# Patient Record
Sex: FEMALE | Race: BLACK OR AFRICAN AMERICAN
[De-identification: names, ages, dates, MRNs, and addresses within clinical notes are randomized per-mention and may not be internally consistent; named-entity substitution may affect disease eponyms.]

---

## 2018-03-21 ENCOUNTER — HOSPITAL ENCOUNTER (EMERGENCY)
Dept: HOSPITAL 62 - ER | Age: 33
Discharge: HOME | End: 2018-03-21
Payer: SELF-PAY

## 2018-03-21 VITALS — DIASTOLIC BLOOD PRESSURE: 94 MMHG | SYSTOLIC BLOOD PRESSURE: 133 MMHG

## 2018-03-21 DIAGNOSIS — L02.214: Primary | ICD-10-CM

## 2018-03-21 DIAGNOSIS — F17.210: ICD-10-CM

## 2018-03-21 DIAGNOSIS — Z88.0: ICD-10-CM

## 2018-03-21 DIAGNOSIS — I10: ICD-10-CM

## 2018-03-21 DIAGNOSIS — Z71.6: ICD-10-CM

## 2018-03-21 PROCEDURE — S0119 ONDANSETRON 4 MG: HCPCS

## 2018-03-21 PROCEDURE — 87205 SMEAR GRAM STAIN: CPT

## 2018-03-21 PROCEDURE — 10060 I&D ABSCESS SIMPLE/SINGLE: CPT

## 2018-03-21 PROCEDURE — 87077 CULTURE AEROBIC IDENTIFY: CPT

## 2018-03-21 PROCEDURE — 99283 EMERGENCY DEPT VISIT LOW MDM: CPT

## 2018-03-21 PROCEDURE — 87186 SC STD MICRODIL/AGAR DIL: CPT

## 2018-03-21 PROCEDURE — A6266 IMPREG GAUZE NO H20/SAL/YARD: HCPCS

## 2018-03-21 PROCEDURE — 87070 CULTURE OTHR SPECIMN AEROBIC: CPT

## 2018-03-21 PROCEDURE — 99406 BEHAV CHNG SMOKING 3-10 MIN: CPT

## 2018-03-21 NOTE — ER DOCUMENT REPORT
ED Skin Rash/Insect Bite/Abscs





- General


Chief Complaint: Abscess


Stated Complaint: SKIN PROBLEM


Time Seen by Provider: 03/21/18 09:25


Mode of Arrival: Ambulatory


Information source: Patient


Notes: 





32-year-old female presents to ED for abscess to the left pubic area.  She 

states that she shaved she thought she did a good job cleaning and clean and 

afterwards so she would not get another one of these abscesses but she does 

have one.  States that there for several days.  She has a history of high blood 

pressure but she has not been on any medication.  Last menstrual period last 

week


TRAVEL OUTSIDE OF THE [place] IN LAST 30 DAYS: No





- HPI


Patient complains to provider of: Tender/swollen area


Onset: Last week


Onset/Duration: Gradual


Quality of pain: Sharp, Throbbing


Severity: Moderate


Pain Level: 4


Skin Character: Abscess


Quality of rash: Painful


Identify cause: No


Exacerbated by: Movement, Walking


Relieved by: Denies


Similar symptoms previously: Yes


Recently seen / treated by doctor: No





- Related Data


Allergies/Adverse Reactions: 


 





Penicillins Allergy (Verified 11/05/15 17:49)


 











Past Medical History





- General


Information source: Patient





- Social History


Smoking Status: Current Every Day Smoker


Cigarette use (# per day): Yes - 2 to 3 black and mild a day


Chew tobacco use (# tins/day): No


Smoking Education Provided: Yes - 4 minutes


Frequency of alcohol use: Occasional


Drug Abuse: None


Lives with: Family


Family History: Arthritis, CVA - Mom at 28., DM, Hypertension, Thyroid 

Disfunction.  denies: CAD, COPD, Hyperlipidemia, Malignancy


Patient has suicidal ideation: No


Patient has homicidal ideation: No





- Past Medical History


Cardiac Medical History: Reports: Hx Hypertension - OFF MEDS SINCE MARCH -- 

STATES WAS SEEN IN OB OFFICE FOR PLACEMENT  OF IUD IN FEB SENT TO THE ER WITH 

HTN - PLACED ON MEDS - RAN OUT --DID NOT RETURN TO ANY PROVIDER FOR REFILL.


Pulmonary Medical History: Reports: None


EENT Medical History: Reports: None


Neurological Medical History: Reports: None


Endocrine Medical History: Reports: None


Renal/ Medical History: Reports: None


Malignancy Medical History: Reports: None


GI Medical History: Reports: None


Musculoskeltal Medical History: Reports None


Skin Medical History: Reports Hx Cellulitis


Psychiatric Medical History: Reports: None


Traumatic Medical History: Reports: None


Infectious Medical History: Reports: None


Surgical Hx: Negative


Past Surgical History: Reports: None





- Immunizations


Immunizations up to date: Yes


Hx Diphtheria, Pertussis, Tetanus Vaccination: Yes - 08/01/2011


Hx Pneumococcal Vaccination: 10/01/01





Review of Systems





- Review of Systems


Constitutional: No symptoms reported


EENT: No symptoms reported


Cardiovascular: No symptoms reported


Respiratory: No symptoms reported


Gastrointestinal: No symptoms reported


Genitourinary: No symptoms reported


Female Genitourinary: No symptoms reported


Musculoskeletal: No symptoms reported


Skin: Other - Abscess left pubic area


Hematologic/Lymphatic: No symptoms reported


Neurological/Psychological: No symptoms reported


-: Yes All other systems reviewed and negative





Physical Exam





- Vital signs


Vitals: 


 











Temp Pulse Resp BP Pulse Ox


 


 98.6 F   93   14   142/93 H  97 


 


 03/21/18 09:09  03/21/18 09:09  03/21/18 09:09  03/21/18 09:09  03/21/18 09:09











Interpretation: Normal





- General


General appearance: Appears well, Alert





- HEENT


Head: Normocephalic, Atraumatic


Eyes: Normal


Pupils: PERRL





- Respiratory


Respiratory status: No respiratory distress


Chest status: Nontender


Breath sounds: Normal


Chest palpation: Normal





- Cardiovascular


Rhythm: Regular


Heart sounds: Normal auscultation


Murmur: No





- Abdominal


Inspection: Normal


Distension: No distension


Bowel sounds: Normal


Tenderness: Nontender


Organomegaly: No organomegaly





- Genitourinary


External exam: Other - Abscess left pubic area





- Back


Back: Normal, Nontender





- Extremities


General upper extremity: Normal inspection, Nontender, Normal color, Normal ROM

, Normal temperature


General lower extremity: Normal inspection, Nontender, Normal color, Normal ROM

, Normal temperature, Normal weight bearing.  No: Long's sign





- Neurological


Neuro grossly intact: Yes


Cognition: Normal


Orientation: AAOx4


[place] Coma Scale Eye Opening: Spontaneous


[place] Coma Scale Verbal: Oriented


[place] Coma Scale Motor: Obeys Commands


[place] Coma Scale Total: 15


Speech: Normal


Motor strength normal: LUE, RUE, LLE, RLE


Sensory: Normal





- Psychological


Associated symptoms: Normal affect, Normal mood





- Skin


Skin Temperature: Warm


Skin Moisture: Dry


Skin Color: Normal


Skin irregularity: Abscess - Left pubic area


Irregularity with: Swelling, Tenderness, Warmth





Course





- Vital Signs


Vital signs: 


 











Temp Pulse Resp BP Pulse Ox


 


 98.3 F   90   16   133/94 H  99 


 


 03/21/18 10:47  03/21/18 10:47  03/21/18 10:47  03/21/18 10:47  03/21/18 10:47














Procedures





- Incision and Drainage


  ** Left Groin


Time completed: 10:33


Type: Simple


Anesthetic type: 1% Lidocaine


mL's of anesthetic: 10


Blade size: 11


I&D procedure: Shurclens applied, Iodoform packing placed


Incision Method: Incision made by scalpel


Amount/type of drainage: Large amount of purulent drainage





Discharge





- Discharge


Clinical Impression: 


 Abscess of left groin





Condition: Stable


Disposition: HOME, SELF-CARE


Additional Instructions: 


ABSCESS:





     You have an abscess (boil).  This a pus-forming infection, usually due to 

staph.  Some boils may be left to drain on their own, but most require lancing.


     From the time the tender lump first appears, it may be three or four days 

before the abscess is ready to oliva.  Local heat and rest help at this stage 

of treatment.  An antibiotic may prevent spread of the infection.


     Once the abscess is opened, packing may be placed into it.  This is done 

so pus is not sealed inside by premature closure of the cavity. The packing 

will be removed at your follow-up visit or you may be advised to remove it 

yourself at home.  Sometimes this packing must be replaced a few times during 

healing.


     The wound will heal with surprisingly little scar.  Depending on the size 

and location of an abscess, healing can take one to four weeks.


     You may shower and wash the area around the incision site two or three 

times a day.


     Antibiotics may be prescribed, but are usually not necessary after an 

abscess has been drained.


     If you develop fever, chills, worsening pain, or increasing swelling in 

the area, call the doctor or return immediately.








POST INCISION AND DRAINAGE:


     You have had an incision made to allow drainage of an abscess. The 

incision must remain open so that pus and debris can drain from the wound.


     If the abscess cavity is large, packing is placed.  This keeps the tissues 

from collapsing and trapping pus inside, while the body shrinks the cavity.  

The packing may need to be replaced every day or two.  The physician will 

instruct you on the packing.


     Keep a bulky dressing over the area.  Replace it if it becomes saturated 

with blood or pus.  Do not disturb the packing (if present).


     You may shower and cleanse the area with gentle soap and warm water two or 

three times a day.  Local warmth may be soothing, and may promote faster 

healing.


     Return if you develop high fever or chills, or if you note spreading 

redness, increasing swelling, or increasing tenderness.








ORAL NARCOTIC MEDICATION:


     You have been given a prescription for pain control.  This medication is a 

narcotic.  It's best taken with food, as nausea can result if taken on an empty 

stomach.


     Don't operate machinery or drive within six hours of taking this 

medication.  Do not combine this medicine with alcohol, or with any medication 

which can cause sedation (such as cold tablets or sleeping pills) unless you 

get permission from the physician.


     Narcotics tend to cause constipation.  If possible, drink plenty of fluids 

and eat a diet high in fiber and fruits.





DOXYCYCLINE:


     Doxycycline (Vibramycin, Doryx) is an antibiotic of the tetracycline 

family.  This type of drug is useful for infections of the respiratory tract 

and genital tract, and is sometimes used for intestinal infections.


     Unlike most tetracyclines, doxycycline can be taken with food.  It is 

longer acting, and (usually) less prone to side effects than regular 

tetracycline.


     Tetracycline antibiotics can stain immature teeth and SHOULD NOT BE TAKEN 

BY CHILDREN, NURSING MOTHERS, OR PREGNANT WOMEN.


     Tetracyclines can make you more prone to sunburn.  Abdominal cramping, 

nausea, and diarrhea are occasional side effects.  Women may experience vaginal 

yeast infections.


     Call the doctor at once if you develop hives, itching, shortness of breath

, or lightheadedness.





Return to the emergency room in 3 days to have your abscess reassessed in the 

packing removed and possibly be placed.








FOLLOW-UP CARE:


Most simple abscesses will not require a follow up visit.   If you had packing 

placed in the abscess, remove it as instructed by the physician.  If you have 

been referred to a physician for follow-up care, call the physicians office 

for an appointment as you were instructed or within the next two days.  If you 

experience worsening or a significant change in your symptoms, return to the 

Emergency Department at any time for re-evaluation.





Prescriptions: 


Oxycodone HCl/Acetaminophen [Percocet 5-325 mg Tablet] 1 tab PO Q6HP PRN #7 

tablet


 PRN Reason: 


Doxycycline Hyclate 100 mg PO BID #20 capsule


Forms:  Elevated Blood Pressure, Smoking Cessation Education, Return to Work


Referrals: 


COLT EDWARDS MD [Primary Care Provider] - Follow up as needed

## 2019-08-15 ENCOUNTER — HOSPITAL ENCOUNTER (EMERGENCY)
Dept: HOSPITAL 62 - ER | Age: 34
Discharge: HOME | End: 2019-08-15
Payer: SELF-PAY

## 2019-08-15 VITALS — SYSTOLIC BLOOD PRESSURE: 156 MMHG | DIASTOLIC BLOOD PRESSURE: 103 MMHG

## 2019-08-15 DIAGNOSIS — F17.200: ICD-10-CM

## 2019-08-15 DIAGNOSIS — I10: ICD-10-CM

## 2019-08-15 DIAGNOSIS — N39.0: Primary | ICD-10-CM

## 2019-08-15 LAB
APPEARANCE UR: (no result)
APTT PPP: YELLOW S
BILIRUB UR QL STRIP: NEGATIVE
GLUCOSE UR STRIP-MCNC: NEGATIVE MG/DL
KETONES UR STRIP-MCNC: NEGATIVE MG/DL
NITRITE UR QL STRIP: NEGATIVE
PH UR STRIP: 6 [PH] (ref 5–9)
PROT UR STRIP-MCNC: NEGATIVE MG/DL
SP GR UR STRIP: 1.02
UROBILINOGEN UR-MCNC: NEGATIVE MG/DL (ref ?–2)

## 2019-08-15 PROCEDURE — 87086 URINE CULTURE/COLONY COUNT: CPT

## 2019-08-15 PROCEDURE — 81001 URINALYSIS AUTO W/SCOPE: CPT

## 2019-08-15 PROCEDURE — 99284 EMERGENCY DEPT VISIT MOD MDM: CPT

## 2019-08-15 NOTE — ER DOCUMENT REPORT
ED Medical Screen (RME)





- General


Chief Complaint: Flank Pain


Stated Complaint: URINARY SYMPTOMS


Time Seen by Provider: 08/15/19 15:54


Primary Care Provider: 


COLT EDWARDS MD [Primary Care Provider] - Follow up as needed


Mode of Arrival: Ambulatory


Information source: Patient


Notes: 





Patient is a otherwise healthy 33-year-old female [facility] dys[facility] and low back pain.  Patient denies any 

vomiting or fevers.





Exam:


Right CVA tenderness present.





I have greeted and performed a rapid initial assessment of this patient.  A 

comprehensive ED assessment and evaluation of the patient, analysis of test 

results and completion of the medical decision making process will be conducted 

by additional ED providers.  I have specifically instructed the patient or 

family members with the patient to immediately return to any nursing staff 

should anything change in the patient's condition or with their chief complaint.





This medical record was dictated with voice recognizing software.  There may be 

grammatical, syntax errors that are unintended.


TRAVEL OUTSIDE OF THE [place] IN LAST 30 DAYS: No





- Related Data


Allergies/Adverse Reactions: 


                                        





Penicillins Allergy (Verified 08/15/19 15:00)


   











Past Medical History





- Social History


Frequency of alcohol use: Occasional


Drug Abuse: None


Family history: Hypertension





- Past Medical History


Cardiac Medical History: Reports: Hx Hypertension - OFF MEDS SINCE MARCH -- 

STATES WAS SEEN IN OB OFFICE FOR PLACEMENT  OF IUD IN FEB SENT TO THE ER WITH 

HTN - PLACED ON MEDS - RAN OUT --DID NOT RETURN TO ANY PROVIDER FOR REFILL.


Renal/ Medical History: Denies: Hx Peritoneal Dialysis


Skin Medical History: Reports Hx Cellulitis





- Immunizations


Immunizations up to date: Yes


Hx Diphtheria, Pertussis, Tetanus Vaccination: Yes - 08/01/2011





Physical Exam





- Vital signs


Vitals: 





                                        











Temp Pulse Resp BP Pulse Ox


 


 98.1 F   83   18   165/109 H  96 


 


 08/15/19 15:01  08/15/19 15:01  08/15/19 15:01  08/15/19 15:01  08/15/19 15:01














Course





- Vital Signs


Vital signs: 





                                        











Temp Pulse Resp BP Pulse Ox


 


 98.1 F   83   18   165/109 H  96 


 


 08/15/19 15:01  08/15/19 15:01  08/15/19 15:01  08/15/19 15:01  08/15/19 15:01














Doctor's Discharge





- Discharge


Referrals: 


COLT EDWARDS MD [Primary Care Provider] - Follow up as needed

## 2019-08-15 NOTE — ER DOCUMENT REPORT
HPI





- HPI


Time Seen by Provider: 08/15/19 15:54


Pain Level: 4


Notes: 





Patient is a otherwise healthy 33-year-old female presented to the emergency 

department chief complaint of dysuria and low back pain.  Patient denies any 

vomiting or fevers.








- REPRODUCTIVE


Reproductive: DENIES: Pregnant:





Past Medical History





- General


Information source: Patient





- Social History


Smoking Status: Current Every Day Smoker


Frequency of alcohol use: Occasional


Drug Abuse: None


Family History: Arthritis, CVA - Mom at 28., DM, Hypertension, Thyroid 

Disfunction.  denies: CAD, COPD, Hyperlipidemia, Malignancy


Patient has suicidal ideation: No


Patient has homicidal ideation: No





- Past Medical History


Cardiac Medical History: Reports: Hx Hypertension - OFF MEDS SINCE MARCH -- 

STATES WAS SEEN IN OB OFFICE FOR PLACEMENT  OF IUD IN FEB SENT TO THE ER WITH 

HTN - PLACED ON MEDS - RAN OUT --DID NOT RETURN TO ANY PROVIDER FOR REFILL.


Renal/ Medical History: Denies: Hx Peritoneal Dialysis


Skin Medical History: Reports Hx Cellulitis





- Immunizations


Immunizations up to date: Yes


Hx Diphtheria, Pertussis, Tetanus Vaccination: Yes - 08/01/2011


Hx Pneumococcal Vaccination: 10/01/01





Vertical Provider Document





- CONSTITUTIONAL


Notes: 





PHYSICAL EXAMINATION:





GENERAL: Well-appearing, well-nourished and in no acute distress.





HEAD: Atraumatic, normocephalic.





EYES: Pupils equal round and reactive to light, extraocular movements intact, 

conjunctiva are normal.





ENT: Nares patent, oropharynx clear without exudates.  Moist mucous membranes.





NECK: Normal range of motion, supple without lymphadenopathy





LUNGS: Breath sounds clear to auscultation bilaterally and equal.  No wheezes 

rales or rhonchi.





HEART: Regular rate and rhythm without murmurs





ABDOMEN: Soft, nontender, nondistended abdomen.  No guarding, no rebound.  No 

masses appreciated.





Female : Right CVA tenderness.





Musculoskeletal: Normal range of motion, no pitting or edema.  No cyanosis.





NEUROLOGICAL: Cranial nerves grossly intact.  Normal speech, normal gait.  

Normal sensory, motor exams





PSYCH: Normal mood, normal affect.





SKIN: Warm, Dry, normal turgor, no rashes or lesions noted.





- INFECTION CONTROL


TRAVEL OUTSIDE OF THE [place] IN LAST 30 DAYS: No





Course





- Re-evaluation


Re-evalutation: 





Urinalysis shows moderate leukocyte esterase.  Patient appears well, nontoxic 

she is alert, oriented and answering all questions appropriately.  Her vital 

signs are within normal limits.  She has been afebrile here in the emergency 

department.  We will start her on oral antibiotics for urinary tract infection. 

Urine culture pending.  Patient understands ED return precautions.





The patient's emergency department workup and current diagnosis were explained 

to the patient and or family.  Follow-up instructions were provided.  

Medications if prescribed were discussed. Instructions for when to return to the

emergency department including specific worrisome symptoms were discussed with 

the patient and/or family.








- Vital Signs


Vital signs: 


                                        











Temp Pulse Resp BP Pulse Ox


 


 98.0 F   75   18   156/103 H  100 


 


 08/15/19 16:51  08/15/19 16:51  08/15/19 16:51  08/15/19 16:51  08/15/19 16:51














- Laboratory


Laboratory results interpreted by me: 


                                        











  08/15/19





  16:04


 


Urine Blood  MODERATE H


 


Ur Leukocyte Esterase  MODERATE H














Discharge





- Discharge


Clinical Impression: 


UTI (urinary tract infection)


Qualifiers:


 Urinary tract infection type: site unspecified Hematuria presence: without he

maturia Qualified Code(s): N39.0 - Urinary tract infection, site not specified





Condition: Stable


Disposition: HOME, SELF-CARE


Additional Instructions: 


Your urine shows findings consistent with a urinary tract infection.  Please 

take all the antibiotics as directed even if your symptoms have improved.  

Please follow-up with your primary care physician as needed.  Return to 

emergency room if you develop fever >101F, persistent vomiting, become 

lethargic, have severe pain in your sides, or any other symptoms that are 

concerning to you.


Prescriptions: 


Nitrofurantoin Macrocrystal [Macrodantin] 100 mg PO BID #14 capsule


Referrals: 


COLT EDWARDS MD [Primary Care Provider] - Follow up as needed

## 2020-08-21 ENCOUNTER — HOSPITAL ENCOUNTER (EMERGENCY)
Dept: HOSPITAL 62 - ER | Age: 35
Discharge: HOME | End: 2020-08-21
Payer: SELF-PAY

## 2020-08-21 VITALS — DIASTOLIC BLOOD PRESSURE: 111 MMHG | SYSTOLIC BLOOD PRESSURE: 163 MMHG

## 2020-08-21 DIAGNOSIS — R31.9: ICD-10-CM

## 2020-08-21 DIAGNOSIS — Z88.0: ICD-10-CM

## 2020-08-21 DIAGNOSIS — A54.9: ICD-10-CM

## 2020-08-21 DIAGNOSIS — R11.0: ICD-10-CM

## 2020-08-21 DIAGNOSIS — I10: ICD-10-CM

## 2020-08-21 DIAGNOSIS — F17.210: ICD-10-CM

## 2020-08-21 DIAGNOSIS — N39.0: Primary | ICD-10-CM

## 2020-08-21 PROCEDURE — S0119 ONDANSETRON 4 MG: HCPCS

## 2020-08-21 PROCEDURE — 81001 URINALYSIS AUTO W/SCOPE: CPT

## 2020-08-21 PROCEDURE — 74176 CT ABD & PELVIS W/O CONTRAST: CPT

## 2020-08-21 PROCEDURE — 81025 URINE PREGNANCY TEST: CPT

## 2020-08-21 PROCEDURE — 99284 EMERGENCY DEPT VISIT MOD MDM: CPT

## 2020-08-21 NOTE — ER DOCUMENT REPORT
ED General Pain





- General


Chief Complaint: Flank Pain


Stated Complaint: LOW BACK PAIN,PAIN WITH URINATION


Information source: Patient


Notes: 





Patient is a 34-year-old female presenting to the emergency department chief 

complaint of lower abdomen right flank and right groin pain.  Patient states it 

is been ongoing for a couple of days.  Of note the patient was just recently 

treated through the health department for gonorrhea.  Patient states her last 

menstrual period completed on 5 August.  Patient denies any slip or falls or any

obvious trauma.


TRAVEL OUTSIDE OF THE [place] IN LAST 30 DAYS: No





- HPI


Onset: Last week


Onset/Duration: Gradual, Persistent


Quality of pain: Achy, Pressure


Severity: Moderate


Pain Level: 2


Context: New onset


Typical of prior episodes of painful crisis: No


Associated symptoms: Other - Frequency urgency and burning with urination


Exacerbated by: Movement, Walking


Relieved by: Denies


Similar symptoms previously: No


Recently seen / treated by doctor: Yes





- Related Data


Allergies/Adverse Reactions: 


                                        





Penicillins Allergy (Verified 08/21/20 08:14)


   








Home Medications: atb





Past Medical History





- General


Information source: Patient





- Social History


Smoking Status: Current Every Day Smoker


Cigarette use (# per day): Yes


Chew tobacco use (# tins/day): No


Smoking Education Provided: Yes


Frequency of alcohol use: None


Drug Abuse: None


Lives with: Spouse/Significant other


Family History: Arthritis, CVA - Mom at 28., DM, Hypertension, Thyroid 

Disfunction.  denies: CAD, COPD, Hyperlipidemia, Malignancy


Patient has suicidal ideation: No


Patient has homicidal ideation: No





- Past Medical History


Cardiac Medical History: Reports: Hx Hypertension - OFF MEDS SINCE MARCH -- 

STATES WAS SEEN IN OB OFFICE FOR PLACEMENT  OF IUD IN FEB SENT TO THE ER WITH 

HTN - PLACED ON MEDS - RAN OUT --DID NOT RETURN TO ANY PROVIDER FOR REFILL.


Renal/ Medical History: Denies: Hx Peritoneal Dialysis


Skin Medical History: Reports Hx Cellulitis





- Immunizations


Immunizations up to date: Yes


Hx Diphtheria, Pertussis, Tetanus Vaccination: Yes - 08/01/2011


Hx Pneumococcal Vaccination: 10/01/01





Review of Systems





- Review of Systems


Notes: 





REVIEW OF SYSTEMS:


CONSTITUTIONAL :  Denies fever,  chills, or sweats.  Denies recent illness.





EENT:   Denies eye, ear, throat, or mouth pain or symptoms.  Denies nasal or 

sinus congestion.





CARDIOVASCULAR:  Denies chest pain.





RESPIRATORY:  Denies cough, cold, or chest congestion.  Denies shortness of 

breath, difficulty breathing, or wheezing.





GASTROINTESTINAL: Per HPI





GENITOURINARY: Per HPI





MUSCULOSKELETAL:  Denies neck or back pain or joint pain or swelling.





SKIN:   Denies rash or skin lesions.





HEMATOLOGIC :   Denies easy bruising or bleeding.





NEUROLOGICAL:  Denies altered mental status or loss of consciousness.  Denies 

headache.  Denies weakness or paralysis or loss of use of either side.  Denies 

problems with gait or speech.  Denies sensory or motor loss.





PSYCHIATRIC:  Denies suicidal or homicidal ideations





10 Systems are negative unless otherwise specified above





Physical Exam





- Vital signs


Vitals: 


                                        











Temp Pulse Resp BP Pulse Ox


 


 98.3 F   81   16   157/105 H  100 


 


 08/21/20 07:36  08/21/20 07:36  08/21/20 07:36  08/21/20 07:36  08/21/20 07:36














- Notes


Notes: 





PHYSICAL EXAMINATION:


 


GENERAL: Well-appearing, well-nourished and in no acute distress.


 


HEAD: Atraumatic, normocephalic.


 


EYES: Pupils equal round and reactive to light, extraocular movements intact, 

sclera anicteric, conjunctiva are normal.


 


ENT: nares patent, oropharynx clear without exudates.  Moist mucous membranes.


 


NECK: Normal range of motion, supple without lymphadenopathy, no appreciable JVD


 


LUNGS: Lungs clear to auscultation bilaterally and equal.  No wheezes rales or 

rhonchi.


 


HEART: Regular rate and rhythm without murmurs


 


ABDOMEN: Patient has tenderness in the low abdomen right groin and has positive 

CVA tenderness.  There is questionable rebound.


 


EXTREMITIES: Active full range of motion, no pitting or edema.  No cyanosis. 2+ 

pulses x4


 


NEUROLOGICAL: No focal neurological deficits. Moves all extremities 

spontaneously and on command.


 


SKIN: Warm, Dry, and intact. Normal turgor, no rashes or lesions noted.





Course





- Re-evaluation


Re-evalutation: 





08/21/20 13:17


Because of the patient's symptomatology and blood in the urine we did perform a 

CT abdomen pelvis stone protocol which demonstrates no acute abdominal pelvic 

abnormalities.  Patient did receive initial dose of Macrobid in the emergency 

department and 1 tablet of Zofran for nausea.  Patient will be discharged home 

in stable condition clinical impression of UTI.  Patient be given a prescription

for Macrobid and instructed to follow-up with her primary care provider as 

needed.  Patient is stable at time of discharge.





- Vital Signs


Vital signs: 


                                        











Temp Pulse Resp BP Pulse Ox


 


 98.2 F   81   16   152/100 H  100 


 


 08/21/20 09:47  08/21/20 07:36  08/21/20 09:47  08/21/20 09:47  08/21/20 07:36














- Laboratory


Laboratory results interpreted by me: 


                                        











  08/21/20





  08:00


 


Urine Blood  SMALL H


 


Ur Leukocyte Esterase  SMALL H














- Diagnostic Test


Radiology reviewed: Reports reviewed





Discharge





- Discharge


Clinical Impression: 


Urinary tract infection


Qualifiers:


 Urinary tract infection type: site unspecified Hematuria presence: with 

hematuria Qualified Code(s): N39.0 - Urinary tract infection, site not 

specified; R31.9 - Hematuria, unspecified





Condition: Stable


Disposition: HOME, SELF-CARE


Instructions:  Urinary Tract Infection ([facility]), Nitrofurantoin ([facility])


Prescriptions: 


Nitrofurantoin Monohyd/M-Cryst [Macrobid 100 mg Capsule] 100 mg PO BID #20 cap


Forms:  Smoking Cessation Education

## 2020-08-21 NOTE — RADIOLOGY REPORT (SQ)
EXAM DESCRIPTION:  CT ABD/PELVIS NO ORAL OR IV



IMAGES COMPLETED DATE/TIME:  8/21/2020 12:51 pm



REASON FOR STUDY:  right cva



COMPARISON:  None.



TECHNIQUE:  CT scan of the abdomen and pelvis performed without intravenous or oral contrast. Images 
reviewed with lung, soft tissue, and bone windows. Reconstructed coronal and sagittal MPR images revi
ewed. All images stored on PACS.

All CT scanners at this facility use dose modulation, iterative reconstruction, and/or weight based d
osing when appropriate to reduce radiation dose to as low as reasonably achievable (ALARA).

[facility]: Dose Right  [facility]: CareDose    [facility]: Dose Right    [facility]: Teradose 4D    [facility]: Smart Enchanted Diamonds



RADIATION DOSE:  CT Rad equipment meets quality standard of care and radiation dose reduction techniq
ues were employed. CTDIvol: 14.4 mGy. DLP: 777 mGy-cm.mGy.



LIMITATIONS:  None.



FINDINGS:  LOWER CHEST: No significant findings. No nodules or infiltrates.

NON-CONTRASTED LIVER, SPLEEN, ADRENALS: Evaluation limited by lack of IV contrast. No identified sign
ificant masses.

PANCREAS: No masses. No peripancreatic inflammatory changes.

GALLBLADDER: Contracted.  No stones.

RIGHT KIDNEY AND URETER: No suspicious masses. Assessment limited by lack of IV contrast.   No signif
icant calcifications.   No hydronephrosis or hydroureter.

LEFT KIDNEY AND URETER: No suspicious masses. Assessment limited by lack of IV contrast.   No signifi
cant calcifications.   No hydronephrosis or hydroureter.

AORTA AND RETROPERITONEUM: No aneurysm. No retroperitoneal masses or adenopathy.

BOWEL AND PERITONEAL CAVITY: No obvious masses or inflammatory changes. No free fluid.

APPENDIX: Not identified.

PELVIS, BLADDER, AND ABDOMINAL WALL:The bladder is not well filled.  No pelvic mass or fluid collecti
on.

BONES: No significant findings.

OTHER: No other significant finding.



IMPRESSION:  NO SIGNIFICANT OR ACUTE PROCESS IN THE ABDOMEN OR PELVIS.



COMMENT:  Quality ID # [id number]: Final reports with documentation of one or more dose reduction techniques
 (e.g., Automated exposure control, adjustment of the mA and/or kV according to patient size, use of 
iterative reconstruction technique)



TECHNICAL DOCUMENTATION:  JOB ID:  [id number]

 2011 [employer]- All Rights Reserved



Reading location - IP/workstation name: [email or web address]